# Patient Record
Sex: FEMALE | Race: WHITE | NOT HISPANIC OR LATINO | URBAN - METROPOLITAN AREA
[De-identification: names, ages, dates, MRNs, and addresses within clinical notes are randomized per-mention and may not be internally consistent; named-entity substitution may affect disease eponyms.]

---
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
## 2025-02-11 NOTE — ED BEHAVIORAL HEALTH ASSESSMENT NOTE - WAS THIS WITHIN THE PAST 3 MONTHS?
Pt requesting stool softeners daily and suppository as needed. Dr. Gillespie notified via [employer] of pt requests. Pt ordered senna nightly and glycolax PRN.     Electronically signed by Inder Lucas RN on 2/11/2025 at 11:33 AM      Per Dr. Gillespie, pt to have Dulcolax suppository daily as needed.   Order placed.   Electronically signed by Inder Lucas RN on 2/11/2025 at 11:40 AM     Yes
